# Patient Record
Sex: MALE | Race: WHITE | NOT HISPANIC OR LATINO | Employment: PART TIME | URBAN - METROPOLITAN AREA
[De-identification: names, ages, dates, MRNs, and addresses within clinical notes are randomized per-mention and may not be internally consistent; named-entity substitution may affect disease eponyms.]

---

## 2018-09-12 PROBLEM — H91.91 HEARING LOSS OF RIGHT EAR: Status: ACTIVE | Noted: 2018-09-12

## 2018-09-12 PROBLEM — R29.810 WEAKNESS ON RIGHT SIDE OF FACE: Status: ACTIVE | Noted: 2018-09-12

## 2018-09-12 PROBLEM — F51.01 PRIMARY INSOMNIA: Status: ACTIVE | Noted: 2018-09-12

## 2018-09-12 PROBLEM — S06.9XAA TRAUMATIC BRAIN INJURY (HCC): Status: ACTIVE | Noted: 2018-09-12

## 2019-09-24 PROBLEM — S42.001K: Status: ACTIVE | Noted: 2019-09-24

## 2020-02-25 PROBLEM — S42.001K: Status: RESOLVED | Noted: 2019-09-24 | Resolved: 2020-02-25

## 2020-04-30 ENCOUNTER — NON-PROVIDER VISIT (OUTPATIENT)
Dept: NEUROLOGY | Facility: MEDICAL CENTER | Age: 35
End: 2020-04-30
Payer: COMMERCIAL

## 2020-04-30 DIAGNOSIS — S06.9X1D TRAUMATIC BRAIN INJURY, WITH LOSS OF CONSCIOUSNESS OF 30 MINUTES OR LESS, SUBSEQUENT ENCOUNTER: ICD-10-CM

## 2020-04-30 PROCEDURE — 95816 EEG AWAKE AND DROWSY: CPT | Performed by: PSYCHIATRY & NEUROLOGY

## 2020-04-30 NOTE — PROCEDURES
ROUTINE ELECTROENCEPHALOGRAM REPORT      DOS: 4/30/2020 (total recording of 29 minutes)    INDICATION:  Addison Givens 34 y.o. male presenting with h/o TBI, seizure.     CURRENT ANTIEPILEPTIC REGIMEN: Valproic Acid 1000 mg qhs, Gabapentin 300 mg tid.     TECHNIQUE: 30 channel routine electroencephalogram (EEG) was performed in accordance with the international 10-20 system. The study was reviewed in bipolar and referential montages. The recording examined the patient during wakeful state.     DESCRIPTION OF THE RECORD:  During the wakefulness, the background showed an asymmetrical 10 Hz alpha activity posteriorly with amplitude of 70 mV on the left. There is slowing on the right.  There was reactivity to eye closure/opening.  A normal anterior-posterior gradient was noted with faster beta frequencies seen anteriorly.      ACTIVATION PROCEDURES:   Intermittent Photic stimulation was performed in a stepwise fashion from 1 to 30 Hz and elicited a normal response (photic driving) only on the left, most noticeable in the left posterior leads.      ICTAL AND/OR INTERICTAL FINDINGS:   There is slowing and rare sharps noted in the right posterior quadrant. No clinical events or seizures were reported or recorded during the study.     EKG: sampling of the EKG recording demonstrated sinus rhythm.       INTERPRETATION:  This is an abnormal routine EEG recording in the awake state. There was slowing and rare sharps noted in the right posterior quadrant. The findings increase risk for seizures and suggest underlying cortical irritability and structural abnormality. No seizures captured during the study. Clinical and radiological correlation is recommended.        Max Shaffer MD   Epilepsy and Neurodiagnostics.   Clinical  of Neurology [facility].   Diplomate in Neurology, Epilepsy, and Electrodiagnostic Medicine.   Office: [phone]  Fax: [phone]

## 2021-03-27 PROBLEM — F90.9 ATTENTION DEFICIT HYPERACTIVITY DISORDER: Status: ACTIVE | Noted: 2021-03-27

## 2022-08-07 PROBLEM — H53.462 LEFT HOMONYMOUS HEMIANOPSIA: Status: ACTIVE | Noted: 2022-08-07

## 2022-08-25 PROBLEM — G40.909 SEIZURE DISORDER (HCC): Status: ACTIVE | Noted: 2022-08-25

## 2022-08-25 PROBLEM — F31.9 BIPOLAR DISORDER (HCC): Status: ACTIVE | Noted: 2022-08-25

## 2023-02-01 PROBLEM — G89.29 CHRONIC BILATERAL LOW BACK PAIN WITHOUT SCIATICA: Status: ACTIVE | Noted: 2023-02-01

## 2023-02-01 PROBLEM — M54.50 CHRONIC BILATERAL LOW BACK PAIN WITHOUT SCIATICA: Status: ACTIVE | Noted: 2023-02-01

## 2023-02-01 PROBLEM — G44.85 PRIMARY STABBING HEADACHE: Status: ACTIVE | Noted: 2023-02-01
